# Patient Record
Sex: FEMALE | Race: WHITE
[De-identification: names, ages, dates, MRNs, and addresses within clinical notes are randomized per-mention and may not be internally consistent; named-entity substitution may affect disease eponyms.]

---

## 2018-10-22 ENCOUNTER — HOSPITAL ENCOUNTER (INPATIENT)
Dept: HOSPITAL 80 - FLD | Age: 33
LOS: 1 days | Discharge: HOME | End: 2018-10-23
Attending: OBSTETRICS & GYNECOLOGY | Admitting: OBSTETRICS & GYNECOLOGY
Payer: COMMERCIAL

## 2018-10-22 DIAGNOSIS — Z3A.40: ICD-10-CM

## 2018-10-22 LAB — PLATELET # BLD: 206 10^3/UL (ref 150–400)

## 2018-10-22 RX ADMIN — ACETAMINOPHEN SCH: 325 TABLET ORAL at 21:14

## 2018-10-22 NOTE — OBPROG
Labor Progress Note


Assessment/Plan: 


Assessment:


IUP at 40w3d


SROM, clear, GBS -


little ctxn pattern























Plan:


nipple stim or pitocin drip, DELON prn


10/22/18 12:05





Subjective/Intrapartum Course: 





10/22/18 12:06


Pt comfortable - ctxns have spaced.  clear fluid - had bloody show last noc.  

leaking since 3 am.  cx 4/90/-1


Objective: 





 





 10/22/18 07:45 





 











Patient ABO/Rh  O POSITIVE   10/22/18  07:45    














- SVE


Dilation (cm): 4


Effacement (%): 90


Station: -1


Membranes: SROM


Amniotic Fluid Color: Clear





- Contraction Pattern Assessment


Current Contraction Pattern: Irregular





- FHR Assessment


  ** De Souza


FHR (bpm): 140


FHR Pattern Variability: Moderate


FHR Category: 1





- AP


Antepartum Course: 





10/22/18 12:09


uncomplicated





Oxytocin Orders Assessment





- Pre-Induction/Augmentation Assessment


Indication: SROM without active labor


Fetal Presentation: Vertex


Gestational Age: 40 week(s) and 3 day(s)


Gestational Age Determined By: Last Menstral Period


Estimated Fetal Weight: 2501-3400g


Membrane Status: Ruptured


Current Sterile Vaginal Exam (SVE): 4/90/-1


Current Contraction Pattern: Irregular





- Fetal Heart Rate Pattern


  ** De Souza


FHR Baseline (bpm): 140


FHR Category: 1


FHR Pattern Variability: Moderate


FHR Accelerations: Present





- Scott's Score


Dilation: 3-4cm


Effacement: 80+


Station: -1,0


Cervix: Medium


Cervix Position: Mid


Scott Score Total: 9





- Induction/Augmentation Consent


Risks/Benefits of Procedure Reviewed/Pt Agrees to Proceed: Yes (if nipple stim 

doesn't work)





ICD10 Worksheet


Patient Problems: 


 Problems











Problem Status Onset


 


Delayed delivery after SROM (spontaneous rupture of membranes) Acute

## 2018-10-22 NOTE — OBPROG
Labor Progress Note


Assessment/Plan: 


Assessment:


IUP at 40w3d


SROM, clear, GBS -


ctxns strong - wants DELON























Plan:


DELON, expect pushing soon


10/22/18 12:05





10/22/18 15:41





Subjective/Intrapartum Course: 





10/22/18 12:06


Pt comfortable - ctxns have spaced.  clear fluid - had bloody show last noc.  

leaking since 3 am.  cx 4/90/-1


10/22/18 15:41


getting out of tub - intense ctxns on 4 mu/min pit.  wants DELON


Objective: 





 





 10/22/18 07:45 





 











Patient ABO/Rh  O POSITIVE   10/22/18  07:45    














- SVE


Membranes: SROM


Amniotic Fluid Color: Clear





- Contraction Pattern Assessment


Current Contraction Pattern: Irregular





- FHR Assessment


  ** De Souza


FHR (bpm): 130


FHR Pattern Variability: Moderate


FHR Category: 1





- AP


Antepartum Course: 





10/22/18 12:09


uncomplicated





- Physical Exam


Estimated Fetal Weight: 2501-3400g





Oxytocin Orders Assessment





- Pre-Induction/Augmentation Assessment


Fetal Presentation: Vertex


Gestational Age: 40 week(s) and 3 day(s)


Estimated Fetal Weight: 2501-3400g





ICD10 Worksheet


Patient Problems: 


 Problems











Problem Status Onset


 


Delayed delivery after SROM (spontaneous rupture of membranes) Acute

## 2018-10-22 NOTE — PDGENHP
History and Physical





- Chief Complaint


PROM





- History of Present Illness


31 yo  at 40w3d by ZURI of 10/19/18 (LMP c/w 10wk US) - called this 

morning with gross ROM, no ctx's before or since. PROM at 0300 with clear 

fluid.  She reports this same thing happened with her son Hu - PROM then 

needed Pitocin at the hospital.  Had back labor, he was OP, 3 hrs of pushing, 

totally exhausted and needed VAVD for maternal exhaustion.  They were 

frustrated with the coaching they got from their labor nurse during stage II.  

This pregnancy quite uncomplicated. 





Prenatal Labs:


 Laboratory Tests











  18





  12:53 12:53 09:00


 


Hgb   


 


Hct   


 


Plt Count   


 


Gestat Glucose Screen   


 


RPR   NONREACTIVE 


 


Candida species DNA   


 


C.trachomatis RNA (TMA)    NEGATIVE


 


Gardnerella DNA Probe   


 


Hep Bs Antigen  NEGATIVE  


 


HIV 1&2 Antibody  NEGATIVE  


 


N.gonorrhoeae RNA (TMA)    NEGATIVE


 


Rubella IgG Antibody  191.00  


 


Group B Strep DNA   


 


Trichomonas DNA Probe   














  18





  10:22 10:22 15:36


 


Hgb  12.0 L  


 


Hct  35.8 L  


 


Plt Count  263  


 


Gestat Glucose Screen   72 L 


 


RPR   


 


Candida species DNA    NEGATIVE


 


C.trachomatis RNA (TMA)   


 


Gardnerella DNA Probe    NEGATIVE


 


Hep Bs Antigen   


 


HIV 1&2 Antibody   


 


N.gonorrhoeae RNA (TMA)   


 


Rubella IgG Antibody   


 


Group B Strep DNA   


 


Trichomonas DNA Probe    NEGATIVE














  18





  16:11


 


Hgb 


 


Hct 


 


Plt Count 


 


Gestat Glucose Screen 


 


RPR 


 


Candida species DNA 


 


C.trachomatis RNA (TMA) 


 


Gardnerella DNA Probe 


 


Hep Bs Antigen 


 


HIV 1&2 Antibody 


 


N.gonorrhoeae RNA (TMA) 


 


Rubella IgG Antibody 


 


Group B Strep DNA  NEGATIVE


 


Trichomonas DNA Probe 














History Information





- Allergies/Home Medication List


Allergies/Adverse Reactions: 








No Known Allergies Allergy (Unverified 09/15/16 00:38)


 





I have personally reviewed and updated: family history, medical history, social 

history, surgical history





- Past Medical History


no pertinent PMH





- Surgical History


Reports: no pertinent surgical hx





- Family History


Positive for: non-pertinent





- Social History


Smoking Status: Never smoked


Alcohol Use: None





Review of Systems


Review of Systems: 





ROS: 10pt was reviewed & negative except for what was stated in HPI & below





Physical Exam


Physical Exam: 


NAD, pleasant, denies any ctx's.


Belly is gravid, soft, longitudinal lie.





FHR 130s, mod diana, accels present no decels


Denhoff no ctx's, irritability


Category I





Assessment & Plan


Assessment: 


31 yo  at 40w3d presents with PROM at 0300 this AM - currently not 

having any s/sx of labor.





PROM:


- GBS neg


- Discussed waiting 4-6 hrs for spontaneous labor, then potentially starting 

Pit if needed.


- Rh (o) pos, Rubella immune.


- Will likely want epidural, thinks her epidural may have been too dense with 

G1 and that was part of the issue with ineffective pushing.





ERIN

## 2018-10-22 NOTE — OBDEL
Birth Info


Birth Type: Vaginal


Presentation at Delivery: Vertex


L&D Analgesia/Anesthesia Type: Epidural


GBS+: No


Intrapartum Medications: 





 











Generic Name Dose Route Start Last Admin





  Trade Name Priceq  PRN Reason Stop Dose Admin


 


Lactated Ringer's  1,000 mls @ 0 mls/hr  10/22/18 05:36  10/22/18 13:24





  Lr  IV  10/23/18 05:35  1,000 mls





  PRN PRN   Administration





  SEE PROTOCOL CONDITIONS   





  Protocol   





  Per Protocol   


 


Oxytocin/Lactated Ringer's  500 mls @ 0 mls/hr  10/22/18 06:00  10/22/18 13:25





  Pitocin 30 Units/Lr (Premix)  IV  19 05:59  500 mls





  CONT MABEL   Administration





  Protocol   





  Per Protocol   














- Hospital Course


Intrapartum: 





10/22/18 12:06


Pt comfortable - ctxns have spaced.  clear fluid - had bloody show last noc.  

leaking since 3 am.  cx /-1


10/22/18 15:41


getting out of tub - intense ctxns on 4 mu/min pit.  wants DELON


Indications for Delivery: SROM





Vaginal Delivery





- Delivery Provider


Delivery Physician/CNM: Ragini Diehl





- Labor and Delivery


Onset of Contractions Date: 10/22/18


Onset of Contractions Time: 09:00


Onset of Contractions Type: Augmented


Rupture of Membranes Date: 10/22/18


Rupture of Membranes Time: 03:00


Rupture of Membranes Type: Spontaneous


Amniotic Fluid Color: Clear


Dilation Complete Date: 10/22/18


Dilation Complete Time: 16:15


Placenta Delivery Date: 10/22/18


Placenta Delivery Time: 19:00


Total Hours of Labor: 10


Laceration: 1st Degree (bilateral inner vagina)


Repair: 3-0, Vicryl


Vaginal Sponge Count Correct: Yes


Vaginal Needle Count Correct: Yes


Vaginal Sweep Performed: Yes


EBL: 300


Delivery Events: Nuchal Cord (tight and unable to reduce - cut on perineum)





- Medications


Labor Augmentation/Induction Methods Used: Pitocin


Labor Augmentation/Induction Indication: Inadequate Contraction Strength





 Birth Data


ZURI: 10/[birth year]


Gestational Age: 40 week(s) and 3 day(s)


  ** De Souza


Delivery Date: 10/22/18


Delivery Time: 18:56


Sex of Infant: Female


Apgar Score (1 Min): 8


Apgar Score (5 Min): 9





ICD10 Worksheet


Patient Problems: 


 Problems











Problem Status Onset


 


 (spontaneous vaginal delivery) Acute

## 2018-10-23 VITALS — SYSTOLIC BLOOD PRESSURE: 115 MMHG | DIASTOLIC BLOOD PRESSURE: 77 MMHG

## 2018-10-23 RX ADMIN — ACETAMINOPHEN SCH MG: 325 TABLET ORAL at 12:05

## 2018-10-23 RX ADMIN — IBUPROFEN SCH MG: 600 TABLET ORAL at 01:25

## 2018-10-23 RX ADMIN — IBUPROFEN SCH MG: 600 TABLET ORAL at 17:41

## 2018-10-23 RX ADMIN — ACETAMINOPHEN SCH: 325 TABLET ORAL at 10:45

## 2018-10-23 RX ADMIN — IBUPROFEN SCH MG: 600 TABLET ORAL at 08:46

## 2018-10-23 RX ADMIN — ACETAMINOPHEN SCH: 325 TABLET ORAL at 00:36

## 2018-10-23 RX ADMIN — ACETAMINOPHEN SCH MG: 325 TABLET ORAL at 12:01

## 2018-10-23 NOTE — OBGCSDC
General Delivery Information





- General Info


: 2


Para: 2


Abortions: 0


Birth Type: Vaginal


L&D Analgesia/Anesthesia Type: Epidural


Admission Date: 10/22/18


Labs: 





 











Patient ABO/Rh  O POSITIVE   10/22/18  07:45    


 


Hct  36.2 % (38.0-47.0)  L  10/22/18  07:45    














- Hospital Course


Antepartum: 





10/22/18 12:09


uncomplicated


Intrapartum: 





10/22/18 12:06


Pt comfortable - ctxns have spaced.  clear fluid - had bloody show last noc.  

leaking since 3 am.  cx /-1


10/22/18 15:41


getting out of tub - intense ctxns on 4 mu/min pit.  wants DELON


Postpartum: 





10/23/18 10:35


Pt seen and examined. Doing well, mild cramping relief with Motrin. Mod lochia. 

Pt is OOB, brenton regular diet, voiding without difficulty, and passing flatus. No 

BM yet. Some difficulty with BF, but going to try Apno to help with cracked 

nipples. They want to go home later this evening.





Vaginal Birth





- Delivery Provider


Delivery Physician/CNM: Ragini Diehl





- Diagnosis


Labor: Augmented


Rupture of Membranes Type: Spontaneous


Amniotic Fluid Color: Clear


Laceration: 1st Degree (bilateral inner vagina)


Repair: 3-0, Vicryl


Delivery Events: Nuchal Cord (tight and unable to reduce - cut on perineum)





 Birth





-  Delivery


EBL: 300





[place] Birth Data


ZURI: 10/19/[birth year]


Gestational Age: 40 week(s) and 4 day(s)


  ** De Souza


Delivery Date: 10/22/18


Delivery Time: 18:56


Sex of Infant: Female


[place] Weight (gm): 3138 g


Apgar Score (1 Min): 8


Apgar Score (5 Min): 9





Discharge Information





- Discharge Information


Condition: Good


Instruction/Follow Up: Four Weeks, Six Weeks

## 2018-10-23 NOTE — OBPP
PostPartum Progress Note


Assessment/Plan: 


Assessment:





s/p   PPD # 1 - pt is stable











Plan:





Continue routine pp care


They are planning to go home later this evening


Instructions reviewed with pt


No Rx given


Cont PNV


Pelvic rest


RTC in 4 and 6 weeks for p visit











10/23/18 10:34





Subjective/Postpartum Course: 





10/23/18 10:35


Pt seen and examined. Doing well, mild cramping relief with Motrin. Mod lochia. 

Pt is OOB, brenton regular diet, voiding without difficulty, and passing flatus. No 

BM yet. Some difficulty with BF, but going to try Apno to help with cracked 

nipples. They want to go home later this evening.


Objective: 





 





 10/22/18 07:45 





 











Patient ABO/Rh  O POSITIVE   10/22/18  07:45    








 











Temp Pulse Resp BP Pulse Ox


 


 36.0 C   90   17   115/77   98 


 


 10/23/18 08:00  10/23/18 08:00  10/23/18 08:00  10/23/18 08:00  10/23/18 08:00











Uterine Position/Fundal Height: Umbilicus -2


Uterine Tone: Firm





PostPartum Physical Exam





- Physical Exam


General Appearance: WD/WN, alert, no apparent distress


Respiratory: lungs clear, normal breath sounds


Cardiac/Chest: regular rate, rhythm


Abdomen: normal bowel sounds, non-tender, soft, flatus (+)


Extremities: non-tender, normal inspection


Skin: normal color, warm/dry


Neuro/Psych: no motor/sensory deficits, alert, normal mood/affect

## 2018-11-05 ENCOUNTER — HOSPITAL ENCOUNTER (OUTPATIENT)
Dept: HOSPITAL 80 - FLACT | Age: 33
End: 2018-11-05
Attending: OBSTETRICS & GYNECOLOGY
Payer: COMMERCIAL

## 2018-11-05 PROCEDURE — G0463 HOSPITAL OUTPT CLINIC VISIT: HCPCS

## 2019-01-31 ENCOUNTER — HOSPITAL ENCOUNTER (OUTPATIENT)
Dept: HOSPITAL 80 - FLACT | Age: 34
End: 2019-01-31
Payer: COMMERCIAL

## 2023-07-09 NOTE — PREANESOB
Obstetric Pre-Anesthesia Info





- General Info


Proposed Procedure: Labor and delivery.


: 2


Para: 1


ZURI: 10/19/18


Gestational Age: 40 week(s) and 3 day(s)





- Fetal Info


Fetal Status: Full Term


Fetal Monitors: External


FHR Baseline (bpm): 145


FHR Pattern: Reassuring





- Labor Status


Cervical Dilation per last OB SVE: 4


Station per last OB SVE: -1


Rupture of Membranes Date: 10/22/18


Rupture of Membranes Time: 03:00


Amniotic Fluid Color: Clear


Indications for Labor Analgesia: Pain Control


Labor Epidural: Proposed





Anesthesia


ROS: 


Prior labor epidural.


Allergies/Adverse Reactions: 


 











Allergy/AdvReac Type Severity Reaction Status Date / Time


 


No Known Allergies Allergy   Unverified 09/15/16 00:38











Home Medications: 














 Medication  Instructions  Recorded


 


Ibuprofen [Motrin (*)] 600 mg PO Q6 PRN #0 tab 16


 


Prenatal  10/22/18











Visit Medications: 





 











Generic Name Dose Route Start Last Admin





  Trade Name Freq  PRN Reason Stop Dose Admin


 


Acetaminophen  650 mg  10/22/18 19:45  





  Tylenol  PO  19 19:44  





  Q6H MABEL   


 


Ammonia (Aromatic Spirit)  1 each  10/22/18 05:36  





  Ammonia Aromatic  IH  18 05:35  





  ONCE PRN   





  Fainting   


 


Diphenhydramine HCl  25 - 50 mg  10/22/18 18:53  





  Benadryl Injection  IVP  19 18:52  





  Q6HRS PRN   





  Itching   


 


Lactated Ringer's  1,000 mls @ 0 mls/hr  10/22/18 05:36  10/22/18 13:24





  Lr  IV  10/23/18 05:35  1,000 mls





  PRN PRN   Administration





  SEE PROTOCOL CONDITIONS   





  Protocol   





  Per Protocol   


 


Oxytocin/Lactated Ringer's  1,000 mls @ 0 mls/hr  10/22/18 05:36  





  Pitocin 20 Units/Lr (Premix)  IV   





  PRN PRN   





  Post partum bleeding   





  Per Protocol   


 


Lactated Ringer's  500 mls @ 500 mls/hr  10/22/18 05:51  





  Lr  IV  10/23/18 05:51  





  PRN PRN   





  Maternal Hypotension   


 


Oxytocin/Lactated Ringer's  500 mls @ 0 mls/hr  10/22/18 06:00  10/22/18 13:25





  Pitocin 30 Units/Lr (Premix)  IV  19 05:59  500 mls





  CONT MABEL   Administration





  Protocol   





  Per Protocol   


 


Lactated Ringer's  500 mls @ 500 mls/hr  10/22/18 12:04  





  Lr  IV  10/23/18 12:04  





  PRN PRN   





  Maternal Hypotension   


 


Oxytocin/Lactated Ringer's  500 mls @ 0 mls/hr  10/22/18 12:30  





  Pitocin 30 Units/Lr (Premix)  IV  19 12:29  





  CONT MABEL   





  Protocol   





  Per Protocol   


 


Fentanyl/Bupivacaine HCl  100 mls @ 0 mls/hr  10/22/18 19:00  





  Fentanyl/Bupivacaine/Ns 2 Mcg/Ml 0.1% (Premix  EP  18 18:59  





  CONT MABEL   





  Protocol   





  As Directed   


 


Lactated Ringer's  500 mls @ 0 mls/hr  10/22/18 19:00  





  Lr  IV  19 18:59  





  CONT [facility]   





  As Directed   


 


Ibuprofen  600 mg  10/22/18 05:36  





  Motrin  PO   





  ONCE PRN   





  post partum, pain   


 


Ibuprofen  600 mg  10/23/18 01:31  





  Motrin  PO  19 01:30  





  Q6H MABEL   


 


Lidocaine HCl  300 mg  10/22/18 05:36  





  Lidocaine Hcl 1%  SC  19 05:35  





  ONCE PRN   





  episiotomy   


 


Magnesium Sulfate  454 gm  10/22/18 05:36  





  Epsom Salt  TP  19 05:35  





  Q1H PRN   





  perineal discomfort    


 


Misoprostol  800 - 1,000 mcg  10/22/18 05:36  





  Cytotec  PO  19 05:35  





  ONCE PRN   





  Vaginal Atony/Bleeding   


 


Olive Oil  118 ml  10/22/18 05:36  





  Sweet Oil  MISC  19 05:35  





  ONCE PRN   





  perineal massage   


 


Ondansetron HCl  4 mg  10/22/18 18:53  





  Zofran  IVP  10/23/18 18:52  





  Q4HRS PRN   





  Nausea/Vomiting, Can't Take PO   


 


Phenylephrine HCl  100 mcg  10/22/18 18:53  





  Neosynephrine  IVP  19 18:52  





  .Q2M PRN   





  Hypotension   


 


Terbutaline Sulfate  0.25 mg  10/22/18 05:36  





  Brethine  IV  19 05:35  





  ONCE PRN   





  Tachysystole   














Discontinued Medications














Generic Name Dose Route Start Last Admin





  Trade Name Freq  PRN Reason Stop Dose Admin


 


Ammonia (Aromatic Spirit)  Confirm  10/22/18 07:54  





  Ammonia Aromatic  Administered  10/22/18 07:55  





  Dose   





  1 each   





  IH   





  .STK-MED ONE   


 


Bupivacaine HCl  Confirm  10/22/18 15:13  





  Sensorcaine 0.25% Sdv  Administered  10/22/18 15:14  





  Dose   





  30 ml   





  .ROUTE   





  .STK-MED ONE   


 


Fentanyl  Confirm  10/22/18 15:14  





  Sublimaze  Administered  10/22/18 15:15  





  Dose   





  100 mcg   





  .ROUTE   





  .STK-MED ONE   


 


Fentanyl/Bupivacaine HCl  Confirm  10/22/18 15:13  





  Fentanyl/Bupivacaine/Ns 2 Mcg/Ml 0.1% (Premix  Administered  10/22/18 15:14  





  Dose   





  100 ml   





  EP   





  .STK-MED ONE   


 


Lidocaine HCl  Confirm  10/22/18 07:54  





  Lidocaine Hcl 1%  Administered  10/22/18 07:55  





  Dose   





  300 mg   





  .ROUTE   





  .STK-MED ONE   


 


Misoprostol  Confirm  10/22/18 07:54  





  Cytotec  Administered  10/22/18 07:55  





  Dose   





  1,000 mcg   





  .ROUTE   





  .STK-MED ONE   


 


Olive Oil  Confirm  10/22/18 07:54  





  Sweet Oil  Administered  10/22/18 07:55  





  Dose   





  118 ml   





  .ROUTE   





  .STK-MED ONE   


 


Oxytocin  Confirm  10/22/18 07:54  





  Pitocin  Administered  10/22/18 07:55  





  Dose   





  40 unit   





  .ROUTE   





  .STK-MED ONE   


 


Phenylephrine HCl  Confirm  10/22/18 15:14  





  Neosynephrine  Administered  10/22/18 15:15  





  Dose   





  1,000 mcg   





  .ROUTE   





  .STK-MED ONE   


 


Terbutaline Sulfate  Confirm  10/22/18 07:54  





  Brethine  Administered  10/22/18 07:55  





  Dose   





  1 mg   





  .ROUTE   





  .STK-MED ONE   














- Anesthesia History


Response to Local Anesthetics: Normal


Anesthesia & Operative History: No Prior Problems


Family Anesthesia History: Negative





- Social History


Substance Use/Abuse: Denies





- Vital Signs


Blood Pressure: 111/69


Heart Rate: 90


Respiratory Rate: 20


Height/Weight (Nursing): 





 











Height                         162.56 cm


 


Weight                         80.739 kg

















- Focused Exam


Neck exam: FROM


Mallampati Score: Class 1


Mouth exam: normal dental/mouth exam


Pulmonary: no respiratory distress


Cardiovascular: regular rate and rhythym


Labs: 





 





 10/22/18 07:45 





 











Patient ABO/Rh  O POSITIVE   10/22/18  07:45    














- Plan


Anesthetic Plan: CSE


Consent Signed and on Chart: Yes


Patient/Guardian Understands and Agrees to Plan: Yes


Urgent/Emergent Case: Joannes pawan completed preop but documented later for safe 

timely pt care normal...